# Patient Record
Sex: FEMALE | Race: WHITE | NOT HISPANIC OR LATINO | Employment: OTHER | URBAN - METROPOLITAN AREA
[De-identification: names, ages, dates, MRNs, and addresses within clinical notes are randomized per-mention and may not be internally consistent; named-entity substitution may affect disease eponyms.]

---

## 2022-01-17 ENCOUNTER — HOSPITAL ENCOUNTER (EMERGENCY)
Facility: HOSPITAL | Age: 82
Discharge: HOME | End: 2022-01-18
Attending: EMERGENCY MEDICINE | Admitting: EMERGENCY MEDICINE

## 2022-01-17 DIAGNOSIS — T50.901A ACCIDENTAL DRUG INGESTION, INITIAL ENCOUNTER: Primary | ICD-10-CM

## 2022-01-17 DIAGNOSIS — N28.9 RENAL INSUFFICIENCY: ICD-10-CM

## 2022-01-17 DIAGNOSIS — R73.9 HYPERGLYCEMIA: ICD-10-CM

## 2022-01-17 PROCEDURE — 99283 EMERGENCY DEPT VISIT LOW MDM: CPT

## 2022-01-18 VITALS
HEART RATE: 83 BPM | DIASTOLIC BLOOD PRESSURE: 82 MMHG | TEMPERATURE: 98 F | WEIGHT: 110 LBS | SYSTOLIC BLOOD PRESSURE: 157 MMHG | BODY MASS INDEX: 21.6 KG/M2 | HEIGHT: 60 IN | OXYGEN SATURATION: 96 % | RESPIRATION RATE: 17 BRPM

## 2022-01-18 LAB
ALBUMIN SERPL-MCNC: 4 G/DL (ref 3.5–5.2)
ALBUMIN/GLOB SERPL: 1.2 G/DL
ALP SERPL-CCNC: 157 U/L (ref 39–117)
ALT SERPL W P-5'-P-CCNC: 11 U/L (ref 1–33)
ANION GAP SERPL CALCULATED.3IONS-SCNC: 12 MMOL/L (ref 5–15)
APAP SERPL-MCNC: <5 MCG/ML (ref 0–30)
AST SERPL-CCNC: 18 U/L (ref 1–32)
BASOPHILS # BLD AUTO: 0 10*3/MM3 (ref 0–0.2)
BASOPHILS NFR BLD AUTO: 0.5 % (ref 0–1.5)
BILIRUB SERPL-MCNC: 0.3 MG/DL (ref 0–1.2)
BUN SERPL-MCNC: 35 MG/DL (ref 8–23)
BUN/CREAT SERPL: 28.9 (ref 7–25)
CALCIUM SPEC-SCNC: 9.9 MG/DL (ref 8.6–10.5)
CHLORIDE SERPL-SCNC: 99 MMOL/L (ref 98–107)
CO2 SERPL-SCNC: 27 MMOL/L (ref 22–29)
CREAT SERPL-MCNC: 1.21 MG/DL (ref 0.57–1)
DEPRECATED RDW RBC AUTO: 39.4 FL (ref 37–54)
EOSINOPHIL # BLD AUTO: 0 10*3/MM3 (ref 0–0.4)
EOSINOPHIL NFR BLD AUTO: 0.3 % (ref 0.3–6.2)
ERYTHROCYTE [DISTWIDTH] IN BLOOD BY AUTOMATED COUNT: 13.1 % (ref 12.3–15.4)
GFR SERPL CREATININE-BSD FRML MDRD: 43 ML/MIN/1.73
GLOBULIN UR ELPH-MCNC: 3.4 GM/DL
GLUCOSE SERPL-MCNC: 211 MG/DL (ref 65–99)
HCT VFR BLD AUTO: 38.4 % (ref 34–46.6)
HGB BLD-MCNC: 13.2 G/DL (ref 12–15.9)
LYMPHOCYTES # BLD AUTO: 0.4 10*3/MM3 (ref 0.7–3.1)
LYMPHOCYTES NFR BLD AUTO: 3.7 % (ref 19.6–45.3)
MCH RBC QN AUTO: 29.2 PG (ref 26.6–33)
MCHC RBC AUTO-ENTMCNC: 34.4 G/DL (ref 31.5–35.7)
MCV RBC AUTO: 85.1 FL (ref 79–97)
MONOCYTES # BLD AUTO: 0.6 10*3/MM3 (ref 0.1–0.9)
MONOCYTES NFR BLD AUTO: 5.4 % (ref 5–12)
NEUTROPHILS NFR BLD AUTO: 9.2 10*3/MM3 (ref 1.7–7)
NEUTROPHILS NFR BLD AUTO: 90.1 % (ref 42.7–76)
NRBC BLD AUTO-RTO: 0 /100 WBC (ref 0–0.2)
PLATELET # BLD AUTO: 294 10*3/MM3 (ref 140–450)
PMV BLD AUTO: 6.9 FL (ref 6–12)
POTASSIUM SERPL-SCNC: 4.4 MMOL/L (ref 3.5–5.2)
PROT SERPL-MCNC: 7.4 G/DL (ref 6–8.5)
RBC # BLD AUTO: 4.52 10*6/MM3 (ref 3.77–5.28)
SALICYLATES SERPL-MCNC: <0.3 MG/DL
SODIUM SERPL-SCNC: 138 MMOL/L (ref 136–145)
TROPONIN T SERPL-MCNC: <0.01 NG/ML (ref 0–0.03)
WBC NRBC COR # BLD: 10.3 10*3/MM3 (ref 3.4–10.8)

## 2022-01-18 PROCEDURE — 93005 ELECTROCARDIOGRAM TRACING: CPT | Performed by: EMERGENCY MEDICINE

## 2022-01-18 PROCEDURE — 80179 DRUG ASSAY SALICYLATE: CPT | Performed by: EMERGENCY MEDICINE

## 2022-01-18 PROCEDURE — 84484 ASSAY OF TROPONIN QUANT: CPT | Performed by: EMERGENCY MEDICINE

## 2022-01-18 PROCEDURE — 80053 COMPREHEN METABOLIC PANEL: CPT | Performed by: EMERGENCY MEDICINE

## 2022-01-18 PROCEDURE — 80143 DRUG ASSAY ACETAMINOPHEN: CPT | Performed by: EMERGENCY MEDICINE

## 2022-01-18 PROCEDURE — 85025 COMPLETE CBC W/AUTO DIFF WBC: CPT | Performed by: EMERGENCY MEDICINE

## 2022-01-18 NOTE — ED PROVIDER NOTES
Subjective   81-year-old female with dementia who lives with son was drowsy and alert but more confused than normal per son after he stepped out 2 hours ago.  Son noticed that patient's pill container for the next 5 days was empty.  Patient had Aricept 10 mg, Gemtesa 75mg, 5 each were missing.  Case discussed with poison control who recommended observation until asymptomatic for 2 hours.          Review of Systems   Unable to perform ROS: Dementia       No past medical history on file.    Allergies   Allergen Reactions   • Aspirin Unknown - Low Severity   • Sulfa Antibiotics Unknown - Low Severity       No past surgical history on file.    No family history on file.    Social History     Socioeconomic History   • Marital status:            Objective   Physical Exam  Constitutional:       Appearance: Normal appearance.   HENT:      Head: Normocephalic and atraumatic.      Mouth/Throat:      Mouth: Mucous membranes are moist.      Pharynx: Oropharynx is clear.   Eyes:      Extraocular Movements: Extraocular movements intact.      Conjunctiva/sclera: Conjunctivae normal.      Pupils: Pupils are equal, round, and reactive to light.   Cardiovascular:      Rate and Rhythm: Normal rate and regular rhythm.      Heart sounds: Normal heart sounds.   Pulmonary:      Effort: Pulmonary effort is normal.      Breath sounds: Normal breath sounds.   Abdominal:      General: Bowel sounds are normal. There is no distension.      Palpations: Abdomen is soft.      Tenderness: There is no abdominal tenderness.   Musculoskeletal:         General: No swelling. Normal range of motion.   Skin:     General: Skin is warm and dry.      Capillary Refill: Capillary refill takes less than 2 seconds.   Neurological:      General: No focal deficit present.      Mental Status: She is alert. Mental status is at baseline. She is disoriented.   Psychiatric:         Mood and Affect: Mood normal.         Behavior: Behavior normal.          Procedures           ED Course  ED Course as of 01/18/22 0354   e Jan 18, 2022   7557 EKG interpretation: Normal sinus rhythm, rate 72, QTc 452, nonspecific ST elevation, unchanged as compared with tracing done on 4/13/2016 [JR]      ED Course User Index  [JR] Noe Mason MD                                                 MDM  Number of Diagnoses or Management Options  Accidental drug ingestion, initial encounter  Hyperglycemia  Renal insufficiency  Diagnosis management comments: Results for orders placed or performed during the hospital encounter of 01/17/22  -Comprehensive Metabolic Panel:   Specimen: Arm, Left; Blood       Result                      Value             Ref Range           Glucose                     211 (H)           65 - 99 mg/dL       BUN                         35 (H)            8 - 23 mg/dL        Creatinine                  1.21 (H)          0.57 - 1.00 *       Sodium                      138               136 - 145 mm*       Potassium                   4.4               3.5 - 5.2 mm*       Chloride                    99                98 - 107 mmo*       CO2                         27.0              22.0 - 29.0 *       Calcium                     9.9               8.6 - 10.5 m*       Total Protein               7.4               6.0 - 8.5 g/*       Albumin                     4.00              3.50 - 5.20 *       ALT (SGPT)                  11                1 - 33 U/L          AST (SGOT)                  18                1 - 32 U/L          Alkaline Phosphatase        157 (H)           39 - 117 U/L        Total Bilirubin             0.3               0.0 - 1.2 mg*       eGFR Non  Amer       43 (L)            >60 mL/min/1*       Globulin                    3.4               gm/dL               A/G Ratio                   1.2               g/dL                BUN/Creatinine Ratio        28.9 (H)          7.0 - 25.0          Anion Gap                   12.0               5.0 - 15.0 m*  -Acetaminophen Level:   Specimen: Arm, Left; Blood       Result                      Value             Ref Range           Acetaminophen               <5.0              0.0 - 30.0 m*  -Salicylate Level:   Specimen: Arm, Left; Blood       Result                      Value             Ref Range           Salicylate                  <0.3              <=30.0 mg/dL   -Troponin:   Specimen: Arm, Left; Blood       Result                      Value             Ref Range           Troponin T                  <0.010            0.000 - 0.03*  -CBC Auto Differential:   Specimen: Arm, Left; Blood       Result                      Value             Ref Range           WBC                         10.30             3.40 - 10.80*       RBC                         4.52              3.77 - 5.28 *       Hemoglobin                  13.2              12.0 - 15.9 *       Hematocrit                  38.4              34.0 - 46.6 %       MCV                         85.1              79.0 - 97.0 *       MCH                         29.2              26.6 - 33.0 *       MCHC                        34.4              31.5 - 35.7 *       RDW                         13.1              12.3 - 15.4 %       RDW-SD                      39.4              37.0 - 54.0 *       MPV                         6.9               6.0 - 12.0 fL       Platelets                   294               140 - 450 10*       Neutrophil %                90.1 (H)          42.7 - 76.0 %       Lymphocyte %                3.7 (L)           19.6 - 45.3 %       Monocyte %                  5.4               5.0 - 12.0 %        Eosinophil %                0.3               0.3 - 6.2 %         Basophil %                  0.5               0.0 - 1.5 %         Neutrophils, Absolute       9.20 (H)          1.70 - 7.00 *       Lymphocytes, Absolute       0.40 (L)          0.70 - 3.10 *       Monocytes, Absolute         0.60              0.10 - 0.90 *       Eosinophils,  Absolute       0.00              0.00 - 0.40 *       Basophils, Absolute         0.00              0.00 - 0.20 *       nRBC                        0.0               0.0 - 0.2 /1*  -ECG 12 Lead:        Result                      Value             Ref Range           QT Interval                 413               ms               Blood pressure is improved, patient calm, at baseline, elevated blood sugar and renal challenge discussed with son, need for follow-up with PCP.       Amount and/or Complexity of Data Reviewed  Clinical lab tests: ordered and reviewed        Final diagnoses:   Accidental drug ingestion, initial encounter   Hyperglycemia   Renal insufficiency       ED Disposition  ED Disposition     ED Disposition Condition Comment    Discharge Stable           No follow-up provider specified.       Medication List      No changes were made to your prescriptions during this visit.          Noe Mason MD  01/18/22 0354

## 2022-01-18 NOTE — ED NOTES
Pt from home with family, hx of dementia, took all of the remaining medications in her medication organizer, per ems pt took 5 days worth of once daily medications. Zofran given per ems for nausea, Pt awake and pleasantly confused, denies any c/o at this time.      Juana Soto RN  01/18/22 0126

## 2022-01-20 LAB — QT INTERVAL: 413 MS

## 2022-01-22 ENCOUNTER — HOSPITAL ENCOUNTER (EMERGENCY)
Facility: HOSPITAL | Age: 82
Discharge: HOME | End: 2022-01-22
Admitting: EMERGENCY MEDICINE

## 2022-01-22 VITALS
SYSTOLIC BLOOD PRESSURE: 149 MMHG | WEIGHT: 105 LBS | TEMPERATURE: 98.1 F | HEART RATE: 80 BPM | HEIGHT: 59 IN | OXYGEN SATURATION: 95 % | RESPIRATION RATE: 18 BRPM | DIASTOLIC BLOOD PRESSURE: 80 MMHG | BODY MASS INDEX: 21.17 KG/M2

## 2022-01-22 DIAGNOSIS — M25.552 LEFT HIP PAIN: Primary | ICD-10-CM

## 2022-01-22 PROCEDURE — 96372 THER/PROPH/DIAG INJ SC/IM: CPT

## 2022-01-22 PROCEDURE — 99283 EMERGENCY DEPT VISIT LOW MDM: CPT

## 2022-01-22 PROCEDURE — 25010000002 HYDROMORPHONE PER 4 MG: Performed by: NURSE PRACTITIONER

## 2022-01-22 RX ORDER — HYDROMORPHONE HCL 110MG/55ML
1 PATIENT CONTROLLED ANALGESIA SYRINGE INTRAVENOUS ONCE
Status: COMPLETED | OUTPATIENT
Start: 2022-01-22 | End: 2022-01-22

## 2022-01-22 RX ORDER — HYDROCODONE BITARTRATE AND ACETAMINOPHEN 5; 325 MG/1; MG/1
1 TABLET ORAL EVERY 6 HOURS PRN
Qty: 8 TABLET | Refills: 0 | Status: SHIPPED | OUTPATIENT
Start: 2022-01-22 | End: 2022-01-24

## 2022-01-22 RX ORDER — HYDROCODONE BITARTRATE AND ACETAMINOPHEN 5; 325 MG/1; MG/1
1 TABLET ORAL ONCE AS NEEDED
Status: COMPLETED | OUTPATIENT
Start: 2022-01-22 | End: 2022-01-22

## 2022-01-22 RX ADMIN — HYDROMORPHONE HYDROCHLORIDE 1 MG: 2 INJECTION, SOLUTION INTRAMUSCULAR; INTRAVENOUS; SUBCUTANEOUS at 15:00

## 2022-01-22 RX ADMIN — HYDROCODONE BITARTRATE AND ACETAMINOPHEN 1 TABLET: 5; 325 TABLET ORAL at 15:45

## 2022-01-22 NOTE — DISCHARGE INSTRUCTIONS
Take the medication every 6 hours as needed to control your pain.  Follow up Monday with Dr. Paul as previously scheduled.  Return to the emergency department with new or worsening symptoms.

## 2022-01-22 NOTE — ED NOTES
Pt c/o left hip pain that is chronic.  Pt is suppose to see a surgeon on Feb 2.  Pain is increasingly worse today.     Danielle Green, LPN  01/22/22 1503

## 2022-01-22 NOTE — ED PROVIDER NOTES
Subjective   History:    81-year-old female presents to the emergency department today with her son for evaluation and treatment of left hip pain.  Reports that the hip pain is chronic in nature and she is being worked up for a hip replacement with Dr. Mcleod but has to see her primary care provider on Monday for medical clearance to have the surgery.  The patient does have advanced dementia and her son is with her to answer questions and give information.  She does live with him.  He reports that the last time she was evaluated she was given a 3-day supply of pain medication but that ran out yesterday.  Her pain was controlled until then.  There was no injury today, no injury that preceded the pain.              Review of Systems   Unable to perform ROS: Dementia       No past medical history on file.    Allergies   Allergen Reactions   • Aspirin Unknown - Low Severity   • Sulfa Antibiotics Unknown - Low Severity       No past surgical history on file.    No family history on file.    Social History     Socioeconomic History   • Marital status:            Objective   Physical Exam  Constitutional:       General: She is not in acute distress.     Appearance: She is normal weight.   HENT:      Head: Normocephalic and atraumatic.   Eyes:      Extraocular Movements: Extraocular movements intact.      Conjunctiva/sclera: Conjunctivae normal.      Pupils: Pupils are equal, round, and reactive to light.   Cardiovascular:      Rate and Rhythm: Normal rate and regular rhythm.      Pulses: Normal pulses.      Heart sounds: Normal heart sounds.   Pulmonary:      Effort: Pulmonary effort is normal.      Breath sounds: Normal breath sounds.   Musculoskeletal:      Cervical back: Normal range of motion and neck supple.      Right hip: Normal.      Left hip: Bony tenderness present. No deformity or lacerations. Decreased range of motion. Normal strength.   Skin:     General: Skin is warm and dry.      Capillary Refill:  Capillary refill takes less than 2 seconds.      Coloration: Skin is pale.   Neurological:      Mental Status: She is alert.      Sensory: Sensation is intact.      Comments: Oriented to person and place   Psychiatric:         Behavior: Behavior is agitated.         Procedures           ED Course      Medications   HYDROmorphone (DILAUDID) injection 1 mg (1 mg Intramuscular Given 1/22/22 1500)   HYDROcodone-acetaminophen (NORCO) 5-325 MG per tablet 1 tablet (1 tablet Oral Given 1/22/22 1545)     Labs Reviewed - No data to display  No orders to display                                                  MDM  Number of Diagnoses or Management Options  Left hip pain  Diagnosis management comments: I examined the patient using the appropriate personal protective equipment.      DISPOSITION:   Chart Review:  Comorbidity:  has no past medical history on file.    Disposition/Treatment:    81-year-old female presents to the emergency department today for evaluation of left hip pain.  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  She is going on Monday to get surgical clearance from her family doctor.  She was given 3 days of pain medication and has run out and now is in severe pain.  Until this point her pain was well controlled.  Called and spoke with patient's PCP, Dr. Katherine Paul, and she advised that she would see her Monday and evaluate need for continuing opiates.  There was no new injury or problem on arrival to the emergency department, only increasing pain because she was out of the Norco.  Patient was given Dilaudid and a Norco and this did significantly reduce her pain and she was ready to go home.  Discussed with son, who is in agreement.  Will be discharged home to follow-up with Dr. Saleem Monday.  Will return to the emergency department for any new or worsening symptoms.  Patient and son are in agreement  with plan.      Final diagnoses:   Left hip pain       ED Disposition  ED Disposition     ED Disposition Condition Comment    Discharge Stable           Katherine Paul MD  [address] [place] [address] [place] [address] [phone]      Follow up Monday as scheduled         Medication List      New Prescriptions    HYDROcodone-acetaminophen 5-325 MG per tablet  Commonly known as: NORCO  Take 1 tablet by mouth Every 6 (Six) Hours As Needed for Moderate Pain  for up to 2 days.           Where to Get Your Medications      These medications were sent to [facility]  [place], [place] - [address] - [phone] [facility] [phone]   [address]  [place] [address]    Phone: [phone]   · HYDROcodone-acetaminophen 5-325 MG per tablet          Lou Clancy, APRN  01/22/22 1038

## 2022-01-26 ENCOUNTER — HOSPITAL ENCOUNTER (EMERGENCY)
Facility: HOSPITAL | Age: 82
Discharge: HOME | End: 2022-01-26
Attending: EMERGENCY MEDICINE | Admitting: EMERGENCY MEDICINE

## 2022-01-26 ENCOUNTER — APPOINTMENT (OUTPATIENT)
Dept: GENERAL RADIOLOGY | Facility: HOSPITAL | Age: 82
End: 2022-01-26

## 2022-01-26 VITALS
HEIGHT: 59 IN | WEIGHT: 105 LBS | RESPIRATION RATE: 18 BRPM | TEMPERATURE: 98.2 F | OXYGEN SATURATION: 99 % | DIASTOLIC BLOOD PRESSURE: 90 MMHG | SYSTOLIC BLOOD PRESSURE: 157 MMHG | BODY MASS INDEX: 21.17 KG/M2 | HEART RATE: 96 BPM

## 2022-01-26 DIAGNOSIS — M25.552 CHRONIC LEFT HIP PAIN: Primary | ICD-10-CM

## 2022-01-26 DIAGNOSIS — G89.29 CHRONIC LEFT HIP PAIN: Primary | ICD-10-CM

## 2022-01-26 PROCEDURE — 99283 EMERGENCY DEPT VISIT LOW MDM: CPT

## 2022-01-26 PROCEDURE — 71046 X-RAY EXAM CHEST 2 VIEWS: CPT

## 2022-01-26 PROCEDURE — 93005 ELECTROCARDIOGRAM TRACING: CPT | Performed by: EMERGENCY MEDICINE

## 2022-01-26 RX ORDER — OXYCODONE HYDROCHLORIDE 5 MG/1
5 TABLET ORAL EVERY 4 HOURS PRN
Status: DISCONTINUED | OUTPATIENT
Start: 2022-01-26 | End: 2022-01-26 | Stop reason: HOSPADM

## 2022-01-26 RX ADMIN — OXYCODONE HYDROCHLORIDE 5 MG: 5 TABLET ORAL at 03:19

## 2022-01-27 LAB — QT INTERVAL: 354 MS

## 2022-02-02 ENCOUNTER — LAB (OUTPATIENT)
Dept: LAB | Facility: HOSPITAL | Age: 82
End: 2022-02-02

## 2022-02-02 ENCOUNTER — TRANSCRIBE ORDERS (OUTPATIENT)
Dept: ADMINISTRATIVE | Facility: HOSPITAL | Age: 82
End: 2022-02-02

## 2022-02-02 DIAGNOSIS — M87.052 AVASCULAR NECROSIS OF BONE OF HIP, LEFT: Primary | ICD-10-CM

## 2022-02-02 DIAGNOSIS — M87.052 AVASCULAR NECROSIS OF BONE OF HIP, LEFT: ICD-10-CM

## 2022-02-02 LAB
BASOPHILS # BLD AUTO: 0.07 10*3/MM3 (ref 0–0.2)
BASOPHILS NFR BLD AUTO: 0.7 % (ref 0–1.5)
CRP SERPL-MCNC: 5.23 MG/DL (ref 0–0.5)
DEPRECATED RDW RBC AUTO: 36 FL (ref 37–54)
EOSINOPHIL # BLD AUTO: 0.06 10*3/MM3 (ref 0–0.4)
EOSINOPHIL NFR BLD AUTO: 0.6 % (ref 0.3–6.2)
ERYTHROCYTE [DISTWIDTH] IN BLOOD BY AUTOMATED COUNT: 11.8 % (ref 12.3–15.4)
ERYTHROCYTE [SEDIMENTATION RATE] IN BLOOD: 20 MM/HR (ref 0–30)
HCT VFR BLD AUTO: 35.8 % (ref 34–46.6)
HGB BLD-MCNC: 12.2 G/DL (ref 12–15.9)
IMM GRANULOCYTES # BLD AUTO: 0.07 10*3/MM3 (ref 0–0.05)
IMM GRANULOCYTES NFR BLD AUTO: 0.7 % (ref 0–0.5)
LYMPHOCYTES # BLD AUTO: 0.69 10*3/MM3 (ref 0.7–3.1)
LYMPHOCYTES NFR BLD AUTO: 6.6 % (ref 19.6–45.3)
MCH RBC QN AUTO: 28.8 PG (ref 26.6–33)
MCHC RBC AUTO-ENTMCNC: 34.1 G/DL (ref 31.5–35.7)
MCV RBC AUTO: 84.4 FL (ref 79–97)
MONOCYTES # BLD AUTO: 1.13 10*3/MM3 (ref 0.1–0.9)
MONOCYTES NFR BLD AUTO: 10.8 % (ref 5–12)
NEUTROPHILS NFR BLD AUTO: 8.49 10*3/MM3 (ref 1.7–7)
NEUTROPHILS NFR BLD AUTO: 80.6 % (ref 42.7–76)
NRBC BLD AUTO-RTO: 0 /100 WBC (ref 0–0.2)
PLATELET # BLD AUTO: 312 10*3/MM3 (ref 140–450)
PMV BLD AUTO: 10 FL (ref 6–12)
RBC # BLD AUTO: 4.24 10*6/MM3 (ref 3.77–5.28)
WBC NRBC COR # BLD: 10.51 10*3/MM3 (ref 3.4–10.8)

## 2022-02-02 PROCEDURE — 86140 C-REACTIVE PROTEIN: CPT

## 2022-02-02 PROCEDURE — 85025 COMPLETE CBC W/AUTO DIFF WBC: CPT

## 2022-02-02 PROCEDURE — 36415 COLL VENOUS BLD VENIPUNCTURE: CPT

## 2022-02-02 PROCEDURE — 85652 RBC SED RATE AUTOMATED: CPT

## 2022-07-06 ENCOUNTER — HOSPITAL ENCOUNTER (EMERGENCY)
Facility: HOSPITAL | Age: 82
Discharge: NURSING, REHAB OR LONG-TERM CARE | End: 2022-07-07
Attending: EMERGENCY MEDICINE | Admitting: EMERGENCY MEDICINE

## 2022-07-06 ENCOUNTER — APPOINTMENT (OUTPATIENT)
Dept: GENERAL RADIOLOGY | Facility: HOSPITAL | Age: 82
End: 2022-07-06

## 2022-07-06 DIAGNOSIS — M25.552 ACUTE HIP PAIN, LEFT: Primary | ICD-10-CM

## 2022-07-06 DIAGNOSIS — S73.005A CLOSED DISLOCATION OF LEFT HIP, INITIAL ENCOUNTER: ICD-10-CM

## 2022-07-06 LAB
ANION GAP SERPL CALCULATED.3IONS-SCNC: 13 MMOL/L (ref 5–15)
BASOPHILS # BLD AUTO: 0 10*3/MM3 (ref 0–0.2)
BASOPHILS NFR BLD AUTO: 0.2 % (ref 0–1.5)
BUN SERPL-MCNC: 24 MG/DL (ref 8–23)
BUN/CREAT SERPL: 22.4 (ref 7–25)
CALCIUM SPEC-SCNC: 9 MG/DL (ref 8.6–10.5)
CHLORIDE SERPL-SCNC: 100 MMOL/L (ref 98–107)
CO2 SERPL-SCNC: 23 MMOL/L (ref 22–29)
CREAT SERPL-MCNC: 1.07 MG/DL (ref 0.57–1)
DEPRECATED RDW RBC AUTO: 38.1 FL (ref 37–54)
EGFRCR SERPLBLD CKD-EPI 2021: 52.6 ML/MIN/1.73
EOSINOPHIL # BLD AUTO: 0.3 10*3/MM3 (ref 0–0.4)
EOSINOPHIL NFR BLD AUTO: 4 % (ref 0.3–6.2)
ERYTHROCYTE [DISTWIDTH] IN BLOOD BY AUTOMATED COUNT: 12.6 % (ref 12.3–15.4)
GLUCOSE SERPL-MCNC: 111 MG/DL (ref 65–99)
HCT VFR BLD AUTO: 33.4 % (ref 34–46.6)
HGB BLD-MCNC: 11 G/DL (ref 12–15.9)
INR PPP: 1.09 (ref 0.93–1.1)
LYMPHOCYTES # BLD AUTO: 1.9 10*3/MM3 (ref 0.7–3.1)
LYMPHOCYTES NFR BLD AUTO: 28.5 % (ref 19.6–45.3)
MCH RBC QN AUTO: 28.6 PG (ref 26.6–33)
MCHC RBC AUTO-ENTMCNC: 33 G/DL (ref 31.5–35.7)
MCV RBC AUTO: 86.4 FL (ref 79–97)
MONOCYTES # BLD AUTO: 0.7 10*3/MM3 (ref 0.1–0.9)
MONOCYTES NFR BLD AUTO: 10.2 % (ref 5–12)
NEUTROPHILS NFR BLD AUTO: 3.9 10*3/MM3 (ref 1.7–7)
NEUTROPHILS NFR BLD AUTO: 57.1 % (ref 42.7–76)
NRBC BLD AUTO-RTO: 0.1 /100 WBC (ref 0–0.2)
PLATELET # BLD AUTO: 327 10*3/MM3 (ref 140–450)
PMV BLD AUTO: 7.3 FL (ref 6–12)
POTASSIUM SERPL-SCNC: 4.5 MMOL/L (ref 3.5–5.2)
PROTHROMBIN TIME: 11.2 SECONDS (ref 9.6–11.7)
RBC # BLD AUTO: 3.87 10*6/MM3 (ref 3.77–5.28)
SODIUM SERPL-SCNC: 136 MMOL/L (ref 136–145)
WBC NRBC COR # BLD: 6.8 10*3/MM3 (ref 3.4–10.8)

## 2022-07-06 PROCEDURE — 73502 X-RAY EXAM HIP UNI 2-3 VIEWS: CPT

## 2022-07-06 PROCEDURE — 85610 PROTHROMBIN TIME: CPT | Performed by: NURSE PRACTITIONER

## 2022-07-06 PROCEDURE — 99284 EMERGENCY DEPT VISIT MOD MDM: CPT

## 2022-07-06 PROCEDURE — 96375 TX/PRO/DX INJ NEW DRUG ADDON: CPT

## 2022-07-06 PROCEDURE — 72170 X-RAY EXAM OF PELVIS: CPT

## 2022-07-06 PROCEDURE — 80048 BASIC METABOLIC PNL TOTAL CA: CPT | Performed by: NURSE PRACTITIONER

## 2022-07-06 PROCEDURE — 25010000002 ONDANSETRON PER 1 MG: Performed by: NURSE PRACTITIONER

## 2022-07-06 PROCEDURE — 96374 THER/PROPH/DIAG INJ IV PUSH: CPT

## 2022-07-06 PROCEDURE — 85025 COMPLETE CBC W/AUTO DIFF WBC: CPT | Performed by: NURSE PRACTITIONER

## 2022-07-06 PROCEDURE — 25010000002 HYDROMORPHONE 1 MG/ML SOLUTION: Performed by: NURSE PRACTITIONER

## 2022-07-06 RX ORDER — ETOMIDATE 2 MG/ML
12 INJECTION INTRAVENOUS ONCE
Status: DISCONTINUED | OUTPATIENT
Start: 2022-07-06 | End: 2022-07-06

## 2022-07-06 RX ORDER — ETOMIDATE 2 MG/ML
5 INJECTION INTRAVENOUS ONCE
Status: COMPLETED | OUTPATIENT
Start: 2022-07-06 | End: 2022-07-06

## 2022-07-06 RX ORDER — SODIUM CHLORIDE 0.9 % (FLUSH) 0.9 %
10 SYRINGE (ML) INJECTION AS NEEDED
Status: DISCONTINUED | OUTPATIENT
Start: 2022-07-06 | End: 2022-07-07 | Stop reason: HOSPADM

## 2022-07-06 RX ORDER — ONDANSETRON 2 MG/ML
4 INJECTION INTRAMUSCULAR; INTRAVENOUS ONCE
Status: COMPLETED | OUTPATIENT
Start: 2022-07-06 | End: 2022-07-06

## 2022-07-06 RX ADMIN — HYDROMORPHONE HYDROCHLORIDE 0.5 MG: 1 INJECTION, SOLUTION INTRAMUSCULAR; INTRAVENOUS; SUBCUTANEOUS at 21:04

## 2022-07-06 RX ADMIN — ONDANSETRON 4 MG: 2 INJECTION INTRAMUSCULAR; INTRAVENOUS at 21:04

## 2022-07-06 RX ADMIN — ETOMIDATE 5 MG: 40 INJECTION, SOLUTION INTRAVENOUS at 22:25

## 2022-07-07 VITALS
HEIGHT: 59 IN | HEART RATE: 68 BPM | DIASTOLIC BLOOD PRESSURE: 68 MMHG | TEMPERATURE: 98.3 F | WEIGHT: 120 LBS | SYSTOLIC BLOOD PRESSURE: 123 MMHG | RESPIRATION RATE: 16 BRPM | BODY MASS INDEX: 24.19 KG/M2 | OXYGEN SATURATION: 99 %

## 2022-07-07 NOTE — ED NOTES
Gabriela, RN from [facility] called to report that there was an active APS report against patient's son and that he was not supposed to take patient from hospital. Gabriela stated that son Trevon Garcia may be aware of patient being in the hospital and her care but potential transport back to [facility] has to be done through the facility.

## 2022-07-07 NOTE — ED PROVIDER NOTES
"Subjective   82-year-old female presents via EMS from [facility] for complaint of left anterior hip pain after weightbearing and \"feeling my hip come out\".  She denies fall.  Reports that she has had a history of fracture and dislocation.  She is unsure who her orthopedist is.    1. Location: Anterior left hip   2. Quality: Sore, achy  3. Severity: Mild to moderate  4. Worsening factors: Weightbearing  5. Alleviating factors: Rest  6. Onset: Prior to arrival  7. Radiation: Denies  8. Frequency: Constant periods of intensity  9. Co-morbidities:   10. Source: Patient            Review of Systems   Musculoskeletal: Positive for arthralgias. Negative for gait problem and myalgias.   Skin: Negative for color change, pallor, rash and wound.   Neurological: Negative for weakness and numbness.   Hematological: Does not bruise/bleed easily.   All other systems reviewed and are negative.      History reviewed. No pertinent past medical history.    Allergies   Allergen Reactions   • Aspirin Unknown - Low Severity   • Keflex [Cephalexin] Unknown - High Severity   • Penicillins Unknown - High Severity   • Sulfa Antibiotics Unknown - Low Severity       History reviewed. No pertinent surgical history.    History reviewed. No pertinent family history.    Social History     Socioeconomic History   • Marital status:            Objective   Physical Exam  Vitals and nursing note reviewed.   Constitutional:       General: She is awake. She is not in acute distress.     Appearance: Normal appearance. She is well-developed and normal weight.   Cardiovascular:      Pulses: Normal pulses.   Musculoskeletal:         General: Tenderness, deformity and signs of injury present. No swelling. Normal range of motion.        Legs:       Comments: Point tenderness noted to the anterior proximal left hip.  There is no overlying ecchymosis noted.  Motor function is decreased due to pain.  Sensation intact.  There is severe external " Make appointment(s) for:   -- chronic pain management visit in 3 months.       1. Tramadol increased to 1 tablet three times a day.     2. Have home care nurse call us how many tablets of tramadol you have left.     3. I will have endocrine office connect with you regarding low glucose.       Medication(s) prescribed today:    Orders Placed This Encounter   Medications     nystatin (MYCOSTATIN) 793112 UNIT/GM external powder     Sig: Apply 1 dose topically 3 times daily as needed     Dispense:  60 g     Refill:  3     traMADol (ULTRAM) 50 MG tablet     Sig: Take 1 tablet (50 mg) by mouth 3 times daily     Refill:  0     Dose increased. Rx not sent.            "rotation with shortening noted.  Distal pulses strong equal bilaterally 2+.  Cap refill less than 2 seconds.   Skin:     General: Skin is warm and dry.      Capillary Refill: Capillary refill takes less than 2 seconds.      Coloration: Skin is not pale.   Neurological:      Mental Status: She is alert and oriented to person, place, and time.      Sensory: No sensory deficit.      Motor: No abnormal muscle tone.   Psychiatric:         Behavior: Behavior normal. Behavior is cooperative.         Thought Content: Thought content normal.         Judgment: Judgment normal.         Lower Extremity Dislocation    Date/Time: 7/6/2022 10:30 PM  Performed by: Gabriela Varner APRN  Authorized by: All Lobato MD   Consent: Verbal consent obtained. Written consent not obtained.  Risks and benefits: risks, benefits and alternatives were discussed  Consent given by: patient  Patient understanding: patient states understanding of the procedure being performed  Site marked: the operative site was marked  Imaging studies: imaging studies available  Patient identity confirmed: verbally with patient and arm band  Time out: Immediately prior to procedure a \"time out\" was called to verify the correct patient, procedure, equipment, support staff and site/side marked as required.  Injury location: hip  Location details: left hip  Injury type: dislocation  Spontaneous dislocation: yes  Prosthesis: yes  Pre-procedure neurovascular assessment: neurovascularly intact  Pre-procedure distal perfusion: normal  Pre-procedure neurological function: normal  Pre-procedure range of motion: reduced    Anesthesia:  Local anesthesia used: no    Sedation:  Patient sedated: yes  Sedation type: moderate (conscious) sedation  Sedatives: etomidate  Vitals: Vital signs were monitored during sedation.    Manipulation performed: yes  Reduction method: extension and traction and counter traction  Reduction successful: yes  X-ray confirmed reduction: " yes  Post-procedure neurovascular assessment: post-procedure neurovascularly intact  Post-procedure distal perfusion: normal  Post-procedure neurological function: normal  Post-procedure range of motion: normal  Patient tolerance: patient tolerated the procedure well with no immediate complications                 ED Course  ED Course as of 07/07/22 0333   Wed Jul 06, 2022 2219 Awaiting conscious sedation to be set up. [AL]      ED Course User Index  [AL] Gabriela Varner, APRN    XR Pelvis 1 or 2 View    Result Date: 7/6/2022  1. Superior dislocation of the left hip hemiprosthesis from the acetabulum. No visible fracture.  Electronically Signed By-Herberth Deluca MD On:7/6/2022 9:24 PM This report was finalized on [id number] by  Herberth Deluca MD.    XR Hip With or Without Pelvis 2 - 3 View Left    Result Date: 7/6/2022  1. Reduction of the previously seen left hip dislocation. Left hip hemiprosthesis now appears in normal alignment.  Electronically Signed By-Herberth Deluca MD On:7/6/2022 11:13 PM This report was finalized on [id number] by  Herberth Deluca MD.    Medications   sodium chloride 0.9 % flush 10 mL (has no administration in time range)   HYDROmorphone (DILAUDID) injection 0.5 mg (0.5 mg Intravenous Given 7/6/22 2104)   ondansetron (ZOFRAN) injection 4 mg (4 mg Intravenous Given 7/6/22 2104)   etomidate (AMIDATE) injection 5 mg (5 mg Intravenous Given 7/6/22 2225)     Labs Reviewed   BASIC METABOLIC PANEL - Abnormal; Notable for the following components:       Result Value    Glucose 111 (*)     BUN 24 (*)     Creatinine 1.07 (*)     eGFR 52.6 (*)     All other components within normal limits    Narrative:     GFR Normal >60  Chronic Kidney Disease <60  Kidney Failure <15     CBC WITH AUTO DIFFERENTIAL - Abnormal; Notable for the following components:    Hemoglobin 11.0 (*)     Hematocrit 33.4 (*)     All other components within normal limits   PROTIME-INR - Normal   CBC AND DIFFERENTIAL     Narrative:     The following orders were created for panel order CBC & Differential.  Procedure                               Abnormality         Status                     ---------                               -----------         ------                     CBC Auto Differential[[id number]]        Abnormal            Final result                 Please view results for these tests on the individual orders.                                            MDM  Number of Diagnoses or Management Options  Acute hip pain, left  Closed dislocation of left hip, initial encounter ([facility])  Diagnosis management comments: Chart Review: 1/26/2022 patient was seen for left hip pain.  Comorbidity: History reviewed. No pertinent past medical history.  Imaging: Was interpreted by physician and reviewed by myself: XR Hip With or Without Pelvis 2 - 3 View Left   Final Result    1. Reduction of the previously seen left hip dislocation. Left hip    hemiprosthesis now appears in normal alignment.         Electronically Signed By-Herberth Deluca MD On:7/6/2022 11:13 PM    This report was finalized on [id number] by  Herberth Deluca MD.     XR Pelvis 1 or 2 View   Final Result    1. Superior dislocation of the left hip hemiprosthesis from the    acetabulum. No visible fracture.         Electronically Signed By-Herberth Deluca MD On:7/6/2022 9:24 PM    This report was finalized on [id number] by  Herberth Deluca MD.    Patient undressed and placed in gown for exam.  Appropriate PPE worn during patient exam. Point tenderness noted to the anterior proximal left hip.  There is no overlying ecchymosis noted.  Motor function is decreased due to pain.  Sensation intact.  There is severe external rotation with shortening noted.  Distal pulses strong equal bilaterally 2+.  Cap refill less than 2 seconds.  IV established and labs obtained.  Patient was given Dilaudid 0.5 mg IV and Zofran 4 mg IV. X-ray of the left hip obtained with the above  findings.  X-ray was significant for dislocation of the prosthesis.  See procedure for reduction.  This was confirmed via x-ray was successful.  Patient was sent back to the extended care facility with fall precautions, and instructions to follow-up with orthopedics.  There is a concern for degeneration of the acetabulum.  Upon reassessment, patient has returned to her baseline.  She reports improvement of range of motion and pain.  She is pink warm and dry no distress noted her vital signs are stable.    Disposition/Treatment: Discussed results with patient, verbalized understanding.  Discussed reasons to return to the ER, patient verbalized understanding.  Agreeable with plan of care.  Patient was stable upon discharge.       Part of this note may be an electronic transcription/translation of spoken language to printed text using the Dragon Dictation System.            Amount and/or Complexity of Data Reviewed  Clinical lab tests: reviewed  Tests in the radiology section of CPT®: reviewed  Decide to obtain previous medical records or to obtain history from someone other than the patient: yes    Patient Progress  Patient progress: stable      Final diagnoses:   Acute hip pain, left   Closed dislocation of left hip, initial encounter (HCC)       ED Disposition  ED Disposition     ED Disposition   Discharge    Condition   Stable    Comment   --             Katherine Paul MD  [address] [place] [address] [place] [address] [phone]    Schedule an appointment as soon as possible for a visit       Carlito Zavala MD  [address] [place] [address] [phone]    Schedule an appointment as soon as possible for a visit       [facility] EMERGENCY DEPARTMENT  [facility] [address] [phone]  Go to   If symptoms worsen         Medication List      No changes were made to your prescriptions during this visit.          Gabriela Varner, APRN  07/07/22  0767

## 2022-07-07 NOTE — ED NOTES
Pt states she was walking to get something to eat and it felt like her hip popped out of place. Pt denies falling.

## 2022-07-07 NOTE — DISCHARGE INSTRUCTIONS
Keep room well lit and free of clutter.  Rotate Tylenol Motrin, if no restrictions, as needed for pain.  Apply ice to the area every 2 hours while awake, on for 15 minutes.  Schedule follow-up with primary care physician for recheck.  Schedule follow-up with orthopedist above, or orthopedist who performed arthroplasty.  Return to the ER for new or worsening symptoms.